# Patient Record
Sex: FEMALE | Race: WHITE | Employment: OTHER | URBAN - METROPOLITAN AREA
[De-identification: names, ages, dates, MRNs, and addresses within clinical notes are randomized per-mention and may not be internally consistent; named-entity substitution may affect disease eponyms.]

---

## 2023-04-25 DIAGNOSIS — R53.83 FATIGUE, UNSPECIFIED TYPE: ICD-10-CM

## 2023-04-25 DIAGNOSIS — D64.9 ANEMIA, UNSPECIFIED TYPE: ICD-10-CM

## 2023-04-25 DIAGNOSIS — E78.5 HYPERLIPIDEMIA, UNSPECIFIED HYPERLIPIDEMIA TYPE: ICD-10-CM

## 2023-04-25 DIAGNOSIS — R73.03 PREDIABETES: ICD-10-CM

## 2023-04-25 DIAGNOSIS — I10 HYPERTENSION, UNSPECIFIED TYPE: ICD-10-CM

## 2023-04-25 DIAGNOSIS — E55.9 VITAMIN D DEFICIENCY: ICD-10-CM

## 2023-06-20 LAB
NON-UH HIE A/G RATIO: 1.3
NON-UH HIE ALB: 4 G/DL (ref 3.4–5)
NON-UH HIE ALK PHOS: 95 UNIT/L (ref 46–116)
NON-UH HIE BASO COUNT: 0.03 X1000 (ref 0–0.2)
NON-UH HIE BASOS %: 0.4 %
NON-UH HIE BILIRUBIN, TOTAL: 0.7 MG/DL (ref 0.2–1)
NON-UH HIE BUN/CREAT RATIO: 23.8
NON-UH HIE BUN: 31 MG/DL (ref 9–23)
NON-UH HIE CALCIUM: 9.3 MG/DL (ref 8.7–10.4)
NON-UH HIE CALCULATED LDL CHOLESTEROL: 60 MG/DL (ref 60–130)
NON-UH HIE CALCULATED OSMOLALITY: 290 MOSM/KG (ref 275–295)
NON-UH HIE CHLORIDE: 106 MMOL/L (ref 98–107)
NON-UH HIE CHOLESTEROL: 133 MG/DL (ref 100–200)
NON-UH HIE CO2, VENOUS: 29 MMOL/L (ref 20–31)
NON-UH HIE CREATININE: 1.3 MG/DL (ref 0.5–0.8)
NON-UH HIE DIFF?: NO
NON-UH HIE EOS COUNT: 0.16 X1000 (ref 0–0.5)
NON-UH HIE EOSIN %: 2.1 %
NON-UH HIE FERRITIN: 48 NG/ML (ref 10–291)
NON-UH HIE GFR AA: 48
NON-UH HIE GLOBULIN: 3 G/DL
NON-UH HIE GLOMERULAR FILTRATION RATE: 39 ML/MIN/1.73M?
NON-UH HIE GLUCOSE: 100 MG/DL (ref 74–106)
NON-UH HIE GOT: 23 UNIT/L (ref 15–37)
NON-UH HIE GPT: 15 UNIT/L (ref 10–49)
NON-UH HIE HCT: 35.4 % (ref 36–46)
NON-UH HIE HDL CHOLESTEROL: 60 MG/DL (ref 40–60)
NON-UH HIE HGB A1C: 5.5 %
NON-UH HIE HGB: 11.9 G/DL (ref 12–16)
NON-UH HIE INSTR WBC: 7.6
NON-UH HIE IRON: 75 UG/DL (ref 40–170)
NON-UH HIE K: 4.7 MMOL/L (ref 3.5–5.1)
NON-UH HIE LYMPH %: 16 %
NON-UH HIE LYMPH COUNT: 1.22 X1000 (ref 1.2–4.8)
NON-UH HIE MCH: 32.1 PG (ref 27–34)
NON-UH HIE MCHC: 33.7 G/DL (ref 32–37)
NON-UH HIE MCV: 95.2 FL (ref 80–100)
NON-UH HIE MONO %: 9 %
NON-UH HIE MONO COUNT: 0.69 X1000 (ref 0.1–1)
NON-UH HIE MPV: 7.1 FL (ref 7.4–10.4)
NON-UH HIE NA: 142 MMOL/L (ref 135–145)
NON-UH HIE NEUTROPHIL %: 72.6 %
NON-UH HIE NEUTROPHIL COUNT (ANC): 5.53 X1000 (ref 1.4–8.8)
NON-UH HIE NUCLEATED RBC: 0 /100WBC
NON-UH HIE PLATELET: 253 X10 (ref 150–450)
NON-UH HIE RBC: 3.71 X10 (ref 4.2–5.4)
NON-UH HIE RDW: 13.7 % (ref 11.5–14.5)
NON-UH HIE TIBC: 341 UG/ML (ref 250–425)
NON-UH HIE TOTAL CHOL/HDL CHOL RATIO: 2.2
NON-UH HIE TOTAL PROTEIN: 7 G/DL (ref 5.7–8.2)
NON-UH HIE TRIGLYCERIDES: 66 MG/DL (ref 30–150)
NON-UH HIE TSH: 3.47 UIU/ML (ref 0.55–4.78)
NON-UH HIE VIT D 25: 54 NG/ML
NON-UH HIE VITAMIN B12: 433 PG/ML (ref 211–911)
NON-UH HIE WBC: 7.6 X10 (ref 4.5–11)

## 2023-07-13 ENCOUNTER — OFFICE VISIT (OUTPATIENT)
Dept: PRIMARY CARE | Facility: CLINIC | Age: 78
End: 2023-07-13
Payer: MEDICARE

## 2023-07-13 VITALS
WEIGHT: 151.2 LBS | HEIGHT: 63 IN | DIASTOLIC BLOOD PRESSURE: 66 MMHG | BODY MASS INDEX: 26.79 KG/M2 | TEMPERATURE: 98.1 F | HEART RATE: 74 BPM | SYSTOLIC BLOOD PRESSURE: 110 MMHG

## 2023-07-13 DIAGNOSIS — I50.9 CONGESTIVE HEART FAILURE, UNSPECIFIED HF CHRONICITY, UNSPECIFIED HEART FAILURE TYPE (MULTI): ICD-10-CM

## 2023-07-13 DIAGNOSIS — J44.9 CHRONIC OBSTRUCTIVE PULMONARY DISEASE, UNSPECIFIED COPD TYPE (MULTI): ICD-10-CM

## 2023-07-13 DIAGNOSIS — N18.32 STAGE 3B CHRONIC KIDNEY DISEASE (MULTI): ICD-10-CM

## 2023-07-13 DIAGNOSIS — E11.22 TYPE 2 DIABETES MELLITUS WITH CHRONIC KIDNEY DISEASE, WITHOUT LONG-TERM CURRENT USE OF INSULIN, UNSPECIFIED CKD STAGE (MULTI): ICD-10-CM

## 2023-07-13 DIAGNOSIS — J81.0 ACUTE PULMONARY EDEMA (MULTI): ICD-10-CM

## 2023-07-13 DIAGNOSIS — Z00.00 MEDICARE ANNUAL WELLNESS VISIT, SUBSEQUENT: Primary | ICD-10-CM

## 2023-07-13 PROBLEM — E55.9 VITAMIN D DEFICIENCY: Status: ACTIVE | Noted: 2023-07-13

## 2023-07-13 PROBLEM — I25.10 CAD (CORONARY ARTERY DISEASE): Status: ACTIVE | Noted: 2023-07-13

## 2023-07-13 PROBLEM — I10 HTN (HYPERTENSION): Status: ACTIVE | Noted: 2023-07-13

## 2023-07-13 PROBLEM — R91.1 LUNG NODULE: Status: ACTIVE | Noted: 2023-07-13

## 2023-07-13 PROBLEM — R53.83 FATIGUE: Status: ACTIVE | Noted: 2023-07-13

## 2023-07-13 PROBLEM — R73.09 ELEVATED GLUCOSE: Status: ACTIVE | Noted: 2023-07-13

## 2023-07-13 PROBLEM — Z86.16 HISTORY OF COVID-19: Status: ACTIVE | Noted: 2023-07-13

## 2023-07-13 PROBLEM — E78.5 HLD (HYPERLIPIDEMIA): Status: ACTIVE | Noted: 2023-07-13

## 2023-07-13 PROBLEM — D64.9 ANEMIA: Status: ACTIVE | Noted: 2023-07-13

## 2023-07-13 PROBLEM — R79.89 ELEVATED TSH: Status: ACTIVE | Noted: 2023-07-13

## 2023-07-13 PROCEDURE — 3074F SYST BP LT 130 MM HG: CPT | Performed by: FAMILY MEDICINE

## 2023-07-13 PROCEDURE — 3078F DIAST BP <80 MM HG: CPT | Performed by: FAMILY MEDICINE

## 2023-07-13 PROCEDURE — 1170F FXNL STATUS ASSESSED: CPT | Performed by: FAMILY MEDICINE

## 2023-07-13 PROCEDURE — G0439 PPPS, SUBSEQ VISIT: HCPCS | Performed by: FAMILY MEDICINE

## 2023-07-13 PROCEDURE — 1159F MED LIST DOCD IN RCRD: CPT | Performed by: FAMILY MEDICINE

## 2023-07-13 PROCEDURE — 1036F TOBACCO NON-USER: CPT | Performed by: FAMILY MEDICINE

## 2023-07-13 PROCEDURE — 1160F RVW MEDS BY RX/DR IN RCRD: CPT | Performed by: FAMILY MEDICINE

## 2023-07-13 RX ORDER — DEXTROMETHORPHAN HYDROBROMIDE, GUAIFENESIN 5; 100 MG/5ML; MG/5ML
1 LIQUID ORAL EVERY 8 HOURS PRN
COMMUNITY
Start: 2019-06-15

## 2023-07-13 RX ORDER — NAPROXEN SODIUM 220 MG/1
81 TABLET, FILM COATED ORAL EVERY 24 HOURS
COMMUNITY
Start: 2021-03-28

## 2023-07-13 RX ORDER — CLOPIDOGREL BISULFATE 75 MG/1
75 TABLET ORAL DAILY
COMMUNITY
Start: 2021-03-28 | End: 2023-12-28 | Stop reason: ALTCHOICE

## 2023-07-13 RX ORDER — METOPROLOL SUCCINATE 25 MG/1
25 TABLET, EXTENDED RELEASE ORAL DAILY
COMMUNITY

## 2023-07-13 RX ORDER — LISINOPRIL 5 MG/1
5 TABLET ORAL DAILY
COMMUNITY

## 2023-07-13 RX ORDER — CHOLECALCIFEROL (VITAMIN D3) 50 MCG
2000 TABLET ORAL DAILY
COMMUNITY
Start: 2019-05-14

## 2023-07-13 RX ORDER — ALBUTEROL SULFATE 90 UG/1
2 AEROSOL, METERED RESPIRATORY (INHALATION) EVERY 4 HOURS PRN
COMMUNITY
Start: 2018-05-16

## 2023-07-13 RX ORDER — ATORVASTATIN CALCIUM 40 MG/1
40 TABLET, FILM COATED ORAL DAILY
COMMUNITY

## 2023-07-13 RX ORDER — SPIRONOLACTONE 25 MG/1
25 TABLET ORAL EVERY 24 HOURS
COMMUNITY
Start: 2021-03-28

## 2023-07-13 RX ORDER — FUROSEMIDE 20 MG/1
20 TABLET ORAL
COMMUNITY
Start: 2021-03-28

## 2023-07-13 ASSESSMENT — PATIENT HEALTH QUESTIONNAIRE - PHQ9
1. LITTLE INTEREST OR PLEASURE IN DOING THINGS: NOT AT ALL
2. FEELING DOWN, DEPRESSED OR HOPELESS: NOT AT ALL
SUM OF ALL RESPONSES TO PHQ9 QUESTIONS 1 AND 2: 0

## 2023-07-13 ASSESSMENT — ACTIVITIES OF DAILY LIVING (ADL)
DOING_HOUSEWORK: INDEPENDENT
GROCERY_SHOPPING: INDEPENDENT
BATHING: INDEPENDENT
MANAGING_FINANCES: INDEPENDENT
DRESSING: INDEPENDENT
TAKING_MEDICATION: INDEPENDENT

## 2023-07-13 NOTE — PROGRESS NOTES
"Subjective   Reason for Visit: Mariam Zuniga is an 78 y.o. female here for a Medicare Wellness visit.     Past Medical, Surgical, and Family History reviewed and updated in chart.    Reviewed all medications by prescribing practitioner or clinical pharmacist (such as prescriptions, OTCs, herbal therapies and supplements) and documented in the medical record.    HPI  79 yo female presents for medicare wellness visit and f/u     Co Right calf bruising for a few days  No swelling; no pain  NKI but does bruise easily with plavix  No hx of dvt/Pe  No recent travel    Recent labs:  Cr 1.3  Lipids, tsh, vit D wnl  Hb 11.9; iron, b12 wnl  HbA1c down to 5.5     hx prediabetes - hba1c in 7/2022 was 6    hx CAD; hx of NSTEMI in march 2021 stents placed in june 2021.   quit smoking at that time.  sees pulm dr vaca- off inh; breathing is fine.   no recent chest pains.   +exercise.  gym - goes 3days/wk  Cardio- dr abel  Scheduled for nuclear stress test next wk    hx of CKD/low GFR-   seeing nephro dr noland    last mammo yrs ago- defers.   no breast changes.    2018 DEXA+osteopenia; defers rechecking  takes a Calcium + Vitamin D supplement  hx of vit D defic    defers colonoscopy or cologuard  did have some constipation issues- saw GI- dr centeno- who recommended colonoscopy but pt defers.   hx of anemia in past    shingles vaccine- not yet- defers  Pneumovax- had  prevnar 13 -had   Flu shot- had  covid shots- had      She denies any chest pains, palpitations, edema, shortness of breath, abdominal pains, reflux, nausea, vomiting, diarrhea, constipation, blood in stool, melena.     sees optho   sees dentist   no mole changes    hx of covid infx and lost taste /smell- saw ENT nadyae  never fully came back    /66   Pulse 74   Temp 36.7 °C (98.1 °F)   Ht 1.588 m (5' 2.5\")   Wt 68.6 kg (151 lb 3.2 oz)   BMI 27.21 kg/m²       Patient Self Assessment of Health Status  Patient Self Assessment: Good    Nutrition and " "Exercise  Current Diet: Well Balanced Diet  Adequate Fluid Intake: Yes  Caffeine: Yes  Exercise Frequency: Infrequently    Functional Ability/Level of Safety  Cognitive Impairment Observed: No cognitive impairment observed    Home Safety Risk Factors: None    Patient Care Team:  Mae Caballero DO as PCP - General  Mae Caballero DO as PCP - [facility] Attributed Provider  Zia Simons MD as Consulting Physician (Cardiology)     Review of Systems  ROS as stated in HPI    Medicare Wellness Billing Compliance Satisfied    *This is a visual tool to show completion of required items on the day of the visit. Green checks will only appear on the date of visit.      Objective   Vitals:  /66   Pulse 74   Temp 36.7 °C (98.1 °F)   Ht 1.588 m (5' 2.5\")   Wt 68.6 kg (151 lb 3.2 oz)   BMI 27.21 kg/m²       Physical Exam  Constitutional: A&O; NAD  HEENT: conjunctiva normal. EOMI; PERRLA; lids normal; TM's clear;   Neck: supple; No lymphadenopathy   Heart: RRR     Lungs: CTA bilateral   Abd: Soft, Nontender, Nondistended  Ext:  No edema; +ecchymosis right calf; nontender    Neuro: No gross neuro deficits    Psych: Judgment, orientation, mood, affect, insight wnl   Assessment/Plan   Problem List Items Addressed This Visit          Cardiac and Vasculature    CHF (congestive heart failure) (CMS/[facility])    Relevant Medications    clopidogrel (Plavix) 75 mg tablet    metoprolol succinate XL (Toprol-XL) 25 mg 24 hr tablet       Endocrine/Metabolic    Type 2 diabetes mellitus with chronic kidney disease, without long-term current use of insulin, unspecified CKD stage (CMS/[facility])       Genitourinary and Reproductive    Stage 3b chronic kidney disease       Pulmonary and Pneumonias    COPD (chronic obstructive pulmonary disease) (CMS/[facility])    Acute pulmonary edema (CMS/[facility])     Other Visit Diagnoses       Medicare annual wellness visit, subsequent    -  Primary          Reviewed recent labs- fax to cardio  f/u with " specialists.  cont to monitor BP  continue healthy diet and exercise.   defers shingrix; other immunizations UTD  defers cologuard, colonoscopy or mammo or dexa at this time.      Monitor BP

## 2023-07-14 PROBLEM — E11.22 TYPE 2 DIABETES MELLITUS WITH CHRONIC KIDNEY DISEASE, WITHOUT LONG-TERM CURRENT USE OF INSULIN, UNSPECIFIED CKD STAGE (MULTI): Status: ACTIVE | Noted: 2023-07-14

## 2023-07-14 PROBLEM — J81.0 ACUTE PULMONARY EDEMA (MULTI): Status: ACTIVE | Noted: 2023-07-14

## 2023-07-14 ASSESSMENT — ACTIVITIES OF DAILY LIVING (ADL)
BATHING: INDEPENDENT
TAKING_MEDICATION: INDEPENDENT
GROCERY_SHOPPING: INDEPENDENT
MANAGING_FINANCES: INDEPENDENT
DRESSING: INDEPENDENT
DOING_HOUSEWORK: INDEPENDENT

## 2023-07-14 ASSESSMENT — PATIENT HEALTH QUESTIONNAIRE - PHQ9
2. FEELING DOWN, DEPRESSED OR HOPELESS: NOT AT ALL
SUM OF ALL RESPONSES TO PHQ9 QUESTIONS 1 AND 2: 0
1. LITTLE INTEREST OR PLEASURE IN DOING THINGS: NOT AT ALL

## 2023-12-27 NOTE — PROGRESS NOTES
"Subjective   Patient ID: Mariam Zuniga is a 78 y.o. female who presents for Follow-up (No UTI symptoms ).    HPI   77 yo female presents for fu recent ER visit on 12/7/23- for covid and UTI    Had cough, congestion, fever, chills, weakness and urinary freq on presentation  Covid was +;  UA was +; no urine culture was sent  Was rxd antiviral and keflex  Is feeling better    Hx of bleeding in eye- seeing retina specialist  Her plavix was stopped  Still on baby aspirin    Pt reports she failed stress test earlier this yr and had a cath that showed blockage but couldn't stent  Sees cardio eryn    6/2023 labs:  Cr 1.3  Lipids, tsh, vit D wnl  Hb 11.9; iron, b12 wnl  HbA1c down to 5.5   hx prediabetes    hx CAD; hx of NSTEMI in march 2021 stents placed in june 2021.   quit smoking at that time.  sees pulm dr vaca- off inh; breathing is fine.  hasn't fu with pulm.   no recent chest pains.      hx of CKD/low GFR-   saw nephro dr noland- has been stable so nephro said she could just fu if any change per pt     last mammo yrs ago- defers.      2018 DEXA+osteopenia; defers rechecking  hx of vit D defic     defers colonoscopy or cologuard  did have some constipation issues- saw GI- dr centeno- who recommended colonoscopy but pt defers.   hx of mild anemia in past recent CBC in ER was wnl     shingles vaccine- not yet- defers  Pneumovax- had  prevnar 13 -had   Flu shot- had  covid shots- had; plans to get latest covid booster.  RSV- not yet        She denies any chest pains, palpitations, edema, shortness of breath, abdominal pains, reflux, nausea, vomiting, diarrhea, constipation, blood in stool, melena.        Review of Systems  ROS as stated in HPI    Objective   /77   Pulse 81   Temp 35.3 °C (95.5 °F)   Ht 1.588 m (5' 2.5\")   Wt 70.4 kg (155 lb 3.2 oz)   SpO2 95%   BMI 27.93 kg/m²     Physical Exam  Constitutional: A&O; NAD  HEENT: conjunctiva normal. EOMI; PERRLA; lids normal; TM's clear;   Neck: supple; " No lymphadenopathy   Heart: RRR     Lungs: CTA bilateral   Abd: Soft, Nontender, Nondistended  Ext:  No edema;    Neuro: No gross neuro deficits     Psych: Judgment, orientation, mood, affect, insight wnl   Assessment/Plan   Problem List Items Addressed This Visit             ICD-10-CM    Elevated glucose R73.09    History of COVID-19 Z86.16    HLD (hyperlipidemia) E78.5    HTN (hypertension) - Primary I10    Vitamin D deficiency E55.9    Acute on chronic diastolic (congestive) heart failure (CMS/HCC) I50.33     Other Visit Diagnoses         Codes    Urinary tract infection without hematuria, site unspecified     N39.0    Relevant Orders    POCT UA (nonautomated) manually resulted (Completed)    Urine Culture          Reviewed records from recent ER visit   f/u with specialists.  cont to monitor BP  continue healthy diet and exercise.   defers shingrix; other immunizations UTD  defers cologuard, colonoscopy or mammo or dexa at this time.      Monitor BP    recheck UA/culture  Obtain copy of cardio notes/stress test/cath  Fu for MWV in July  Getting eye injections-off plavix

## 2023-12-28 ENCOUNTER — OFFICE VISIT (OUTPATIENT)
Dept: PRIMARY CARE | Facility: CLINIC | Age: 78
End: 2023-12-28
Payer: MEDICARE

## 2023-12-28 VITALS
SYSTOLIC BLOOD PRESSURE: 125 MMHG | DIASTOLIC BLOOD PRESSURE: 77 MMHG | HEIGHT: 63 IN | WEIGHT: 155.2 LBS | TEMPERATURE: 95.5 F | OXYGEN SATURATION: 95 % | HEART RATE: 81 BPM | BODY MASS INDEX: 27.5 KG/M2

## 2023-12-28 DIAGNOSIS — I10 HYPERTENSION, UNSPECIFIED TYPE: Primary | ICD-10-CM

## 2023-12-28 DIAGNOSIS — E78.5 HYPERLIPIDEMIA, UNSPECIFIED HYPERLIPIDEMIA TYPE: ICD-10-CM

## 2023-12-28 DIAGNOSIS — R73.09 ELEVATED GLUCOSE: ICD-10-CM

## 2023-12-28 DIAGNOSIS — N39.0 URINARY TRACT INFECTION WITHOUT HEMATURIA, SITE UNSPECIFIED: ICD-10-CM

## 2023-12-28 DIAGNOSIS — Z86.16 HISTORY OF COVID-19: ICD-10-CM

## 2023-12-28 DIAGNOSIS — E55.9 VITAMIN D DEFICIENCY: ICD-10-CM

## 2023-12-28 DIAGNOSIS — I50.33 ACUTE ON CHRONIC DIASTOLIC (CONGESTIVE) HEART FAILURE (MULTI): ICD-10-CM

## 2023-12-28 LAB
POC APPEARANCE, URINE: CLEAR
POC BILIRUBIN, URINE: NEGATIVE
POC BLOOD, URINE: ABNORMAL
POC COLOR, URINE: YELLOW
POC GLUCOSE, URINE: NEGATIVE MG/DL
POC KETONES, URINE: NEGATIVE MG/DL
POC LEUKOCYTES, URINE: NEGATIVE
POC NITRITE,URINE: NEGATIVE
POC PH, URINE: 5.5 PH
POC PROTEIN, URINE: NEGATIVE MG/DL
POC SPECIFIC GRAVITY, URINE: 1.01
POC UROBILINOGEN, URINE: 0.2 EU/DL

## 2023-12-28 PROCEDURE — 87086 URINE CULTURE/COLONY COUNT: CPT

## 2023-12-28 PROCEDURE — 99214 OFFICE O/P EST MOD 30 MIN: CPT | Performed by: FAMILY MEDICINE

## 2023-12-28 PROCEDURE — 1159F MED LIST DOCD IN RCRD: CPT | Performed by: FAMILY MEDICINE

## 2023-12-28 PROCEDURE — 3074F SYST BP LT 130 MM HG: CPT | Performed by: FAMILY MEDICINE

## 2023-12-28 PROCEDURE — 1160F RVW MEDS BY RX/DR IN RCRD: CPT | Performed by: FAMILY MEDICINE

## 2023-12-28 PROCEDURE — 3078F DIAST BP <80 MM HG: CPT | Performed by: FAMILY MEDICINE

## 2023-12-28 PROCEDURE — 1036F TOBACCO NON-USER: CPT | Performed by: FAMILY MEDICINE

## 2023-12-28 PROCEDURE — 81002 URINALYSIS NONAUTO W/O SCOPE: CPT | Performed by: FAMILY MEDICINE

## 2023-12-28 ASSESSMENT — PATIENT HEALTH QUESTIONNAIRE - PHQ9
2. FEELING DOWN, DEPRESSED OR HOPELESS: NOT AT ALL
1. LITTLE INTEREST OR PLEASURE IN DOING THINGS: NOT AT ALL
SUM OF ALL RESPONSES TO PHQ9 QUESTIONS 1 AND 2: 0

## 2023-12-30 LAB — BACTERIA UR CULT: NO GROWTH

## 2024-01-02 ENCOUNTER — TELEPHONE (OUTPATIENT)
Dept: PRIMARY CARE | Facility: CLINIC | Age: 79
End: 2024-01-02
Payer: MEDICARE

## 2024-01-02 NOTE — TELEPHONE ENCOUNTER
Patient would like the blood orders for her physical in July mailed to her houses so she can get the blood work before her appointment.

## 2024-01-03 DIAGNOSIS — R53.83 OTHER FATIGUE: ICD-10-CM

## 2024-01-03 DIAGNOSIS — I10 HYPERTENSION, UNSPECIFIED TYPE: ICD-10-CM

## 2024-01-03 DIAGNOSIS — R73.09 ELEVATED GLUCOSE: ICD-10-CM

## 2024-01-03 DIAGNOSIS — E55.9 VITAMIN D DEFICIENCY: ICD-10-CM

## 2024-01-03 DIAGNOSIS — D64.9 ANEMIA, UNSPECIFIED TYPE: Primary | ICD-10-CM

## 2024-01-03 DIAGNOSIS — E78.5 HYPERLIPIDEMIA, UNSPECIFIED HYPERLIPIDEMIA TYPE: ICD-10-CM

## 2024-01-04 ENCOUNTER — TELEPHONE (OUTPATIENT)
Dept: PRIMARY CARE | Facility: CLINIC | Age: 79
End: 2024-01-04
Payer: MEDICARE

## 2024-01-04 NOTE — TELEPHONE ENCOUNTER
----- Message from Mae Caballero DO sent at 12/30/2023  2:09 PM EST -----  Please let pt know their urine culture was negative for an infection.

## 2024-05-14 LAB
HEMOGLOBIN A1C/HEMOGLOBIN TOTAL IN BLOOD EXTERNAL: 5.6 %
NON-UH HIE A/G RATIO: 1.2
NON-UH HIE ALB: 4 G/DL (ref 3.4–5)
NON-UH HIE ALK PHOS: 88 UNIT/L (ref 45–117)
NON-UH HIE BASO COUNT: 0.07 X1000 (ref 0–0.2)
NON-UH HIE BASOS %: 0.7 %
NON-UH HIE BILIRUBIN, TOTAL: 0.8 MG/DL (ref 0.3–1.2)
NON-UH HIE BUN/CREAT RATIO: 20
NON-UH HIE BUN: 30 MG/DL (ref 9–23)
NON-UH HIE CALCIUM: 9.5 MG/DL (ref 8.7–10.4)
NON-UH HIE CALCULATED LDL CHOLESTEROL: 65 MG/DL (ref 60–130)
NON-UH HIE CALCULATED OSMOLALITY: 286 MOSM/KG (ref 275–295)
NON-UH HIE CHLORIDE: 105 MMOL/L (ref 98–107)
NON-UH HIE CHOLESTEROL: 149 MG/DL (ref 100–200)
NON-UH HIE CO2, VENOUS: 28 MMOL/L (ref 20–31)
NON-UH HIE CREATININE: 1.5 MG/DL (ref 0.5–0.8)
NON-UH HIE DIFF?: NO
NON-UH HIE EOS COUNT: 0.14 X1000 (ref 0–0.5)
NON-UH HIE EOSIN %: 1.6 %
NON-UH HIE FERRITIN: 52 NG/ML (ref 10–291)
NON-UH HIE GFR AA: 40
NON-UH HIE GLOBULIN: 3.4 G/DL
NON-UH HIE GLOMERULAR FILTRATION RATE: 33 ML/MIN/1.73M?
NON-UH HIE GLUCOSE: 105 MG/DL (ref 74–106)
NON-UH HIE GOT: 29 UNIT/L (ref 15–37)
NON-UH HIE GPT: 18 UNIT/L (ref 10–49)
NON-UH HIE HCT: 37.9 % (ref 36–46)
NON-UH HIE HDL CHOLESTEROL: 68 MG/DL (ref 40–60)
NON-UH HIE HGB A1C: 5.6 %
NON-UH HIE HGB: 12.6 G/DL (ref 12–16)
NON-UH HIE INSTR WBC: 9.1
NON-UH HIE IRON: 121 UG/DL (ref 50–170)
NON-UH HIE K: 4.7 MMOL/L (ref 3.5–5.1)
NON-UH HIE LYMPH %: 15.7 %
NON-UH HIE LYMPH COUNT: 1.43 X1000 (ref 1.2–4.8)
NON-UH HIE MCH: 31.9 PG (ref 27–34)
NON-UH HIE MCHC: 33.2 G/DL (ref 32–37)
NON-UH HIE MCV: 96 FL (ref 80–100)
NON-UH HIE MONO %: 8.1 %
NON-UH HIE MONO COUNT: 0.74 X1000 (ref 0.1–1)
NON-UH HIE MPV: 7.3 FL (ref 7.4–10.4)
NON-UH HIE NA: 140 MMOL/L (ref 135–145)
NON-UH HIE NEUTROPHIL %: 73.9 %
NON-UH HIE NEUTROPHIL COUNT (ANC): 6.73 X1000 (ref 1.4–8.8)
NON-UH HIE NUCLEATED RBC: 0 /100WBC
NON-UH HIE PLATELET: 280 X10 (ref 150–450)
NON-UH HIE RBC: 3.95 X10 (ref 4.2–5.4)
NON-UH HIE RDW: 13.3 % (ref 11.5–14.5)
NON-UH HIE SATURATION: 32.3 % (ref 20–50)
NON-UH HIE TIBC: 375 UG/ML (ref 250–425)
NON-UH HIE TOTAL CHOL/HDL CHOL RATIO: 2.2
NON-UH HIE TOTAL PROTEIN: 7.4 G/DL (ref 5.7–8.2)
NON-UH HIE TRIGLYCERIDES: 81 MG/DL (ref 30–150)
NON-UH HIE TSH: 4.44 UIU/ML (ref 0.55–4.78)
NON-UH HIE VIT D 25: 69 NG/ML
NON-UH HIE VITAMIN B12: 626 PG/ML (ref 211–911)
NON-UH HIE WBC: 9.1 X10 (ref 4.5–11)

## 2024-05-16 ENCOUNTER — TELEPHONE (OUTPATIENT)
Dept: PRIMARY CARE | Facility: CLINIC | Age: 79
End: 2024-05-16
Payer: MEDICARE

## 2024-05-16 NOTE — TELEPHONE ENCOUNTER
----- Message from Mae Caballero, DO sent at 5/14/2024  6:54 PM EDT -----  Please let pt know that her  blood counts, iron, vitamin B12, vitamin D, sugar, electrolytes, thyroid, liver function are all normal and her cholesterol was good.   Her kidney function was again elevated at 1.5 (should be less than 0.8).    Please fax to dr noland and let her know we will continue to monitor.

## 2024-07-07 NOTE — PROGRESS NOTES
"Subjective   Reason for Visit: Mariam Zuniga is an 79 y.o. female here for a Medicare Wellness visit.     Past Medical, Surgical, and Family History reviewed and updated in chart.    Reviewed all medications by prescribing practitioner or clinical pharmacist (such as prescriptions, OTCs, herbal therapies and supplements) and documented in the medical record.    HPI    80 yo female presents for MWV    Hx of bleeding in eye- seeing retina specialist  Her plavix was stopped  Still on baby aspirin  Getting injections in both eyes    5/14/24  labs: cbc, iron, b12, vit D, tsh, lipids were good.   Cr 1.5 - sees dr noland  HbA1c down to 5.6      hx prediabetes    hx CAD; hx of NSTEMI in march 2021 stents placed in june 2021.   quit smoking at that time.  sees pulm dr vaca- off inh; breathing is fine.  hasn't fu with pulm.   no recent chest pains.    sees cardio- depam/raorence -saw him 7/5/24  Goes to gym 3 days/wk    hx of CKD/low GFR-   saw nephro dr noland- has been stable so nephro said she could just fu if any change;  last saw him 10/2023     last mammo yrs ago- defers.      2018 DEXA+osteopenia; defers rechecking  hx of vit D defic  Takes vit d supplement     defers colonoscopy or cologuard  did have some constipation issues- saw GI- dr centeno- who recommended colonoscopy but pt defers and bowels have been better  hx of mild anemia in past recent CBC was wnl     shingles vaccine- not yet- defers  Pneumovax- had  prevnar 13 -had   Flu shot- had  covid shots- had and had booster 1/2024  RSV- had        She denies any chest pains, palpitations, edema, shortness of breath, abdominal pains, reflux, nausea, vomiting, diarrhea, constipation, blood in stool, melena.     Sees optho  Sees dentist for regular cleanings  No mole changes  Doesn't see derm  No hx of skin CA       /55   Pulse 67   Temp 36.7 °C (98.1 °F)   Ht 1.588 m (5' 2.5\")   Wt 68.6 kg (151 lb 3.2 oz)   SpO2 93%   BMI 27.21 kg/m²     Patient " "Self Assessment of Health Status  Patient Self Assessment: Good    Nutrition and Exercise  Current Diet: Well Balanced Diet  Adequate Fluid Intake: Yes  Caffeine: Yes  Exercise Frequency: Infrequently    Functional Ability/Level of Safety  Cognitive Impairment Observed: No cognitive impairment observed    Home Safety Risk Factors: None    Patient Care Team:  Mae Caballero DO as PCP - General  Mae Caballero DO as PCP - [facility] ACO Attributed Provider  Zia Simons MD as Consulting Physician (Cardiology)     Review of Systems  ROS as stated in HPI    Medicare Wellness Billing Compliance Satisfied    *This is a visual tool to show completion of required items on the day of the visit. Green checks will only appear on the date of visit.      Objective   Vitals:  /55   Pulse 67   Temp 36.7 °C (98.1 °F)   Ht 1.588 m (5' 2.5\")   Wt 68.6 kg (151 lb 3.2 oz)   SpO2 93%   BMI 27.21 kg/m²       Physical Exam  Constitutional: A&O; NAD  HEENT: conjunctiva normal. EOMI; PERRLA; lids normal; TM's clear;   Neck: supple; No lymphadenopathy   Heart: RRR     Lungs: CTA bilateral   Abd: Soft, Nontender, Nondistended  Ext:  No edema;    Neuro: No gross neuro deficits     Psych: Judgment, orientation, mood, affect, insight wnl   Assessment/Plan   Problem List Items Addressed This Visit       Stage 3b chronic kidney disease (Multi)    COPD (chronic obstructive pulmonary disease) (Multi)    HTN (hypertension)    Relevant Orders    Basic metabolic panel    Acute pulmonary edema (Multi)    Acute on chronic diastolic (congestive) heart failure (Multi)    Other specified peripheral vascular diseases (CMS-HCC)     Other Visit Diagnoses       Medicare annual wellness visit, subsequent    -  Primary    Elevated serum creatinine        Relevant Orders    Basic metabolic panel        reviewed labs from 5/14/24;   Recheck BMP today  cont to monitor BP  continue healthy diet and exercise.   defers shingrix; other immunizations " UTD  defers cologuard, colonoscopy or mammo or dexa at this time.      Monitor BP   Reviewed recent cardio note from 7/5/24; fu 1 yr

## 2024-07-09 ENCOUNTER — APPOINTMENT (OUTPATIENT)
Dept: PRIMARY CARE | Facility: CLINIC | Age: 79
End: 2024-07-09
Payer: MEDICARE

## 2024-07-09 ENCOUNTER — LAB (OUTPATIENT)
Dept: LAB | Facility: LAB | Age: 79
End: 2024-07-09
Payer: MEDICARE

## 2024-07-09 VITALS
BODY MASS INDEX: 26.79 KG/M2 | WEIGHT: 151.2 LBS | HEIGHT: 63 IN | SYSTOLIC BLOOD PRESSURE: 123 MMHG | OXYGEN SATURATION: 93 % | TEMPERATURE: 98.1 F | HEART RATE: 67 BPM | DIASTOLIC BLOOD PRESSURE: 55 MMHG

## 2024-07-09 DIAGNOSIS — R79.89 ELEVATED SERUM CREATININE: ICD-10-CM

## 2024-07-09 DIAGNOSIS — J81.0 ACUTE PULMONARY EDEMA (MULTI): ICD-10-CM

## 2024-07-09 DIAGNOSIS — J44.9 CHRONIC OBSTRUCTIVE PULMONARY DISEASE, UNSPECIFIED COPD TYPE (MULTI): ICD-10-CM

## 2024-07-09 DIAGNOSIS — I10 HYPERTENSION, UNSPECIFIED TYPE: ICD-10-CM

## 2024-07-09 DIAGNOSIS — N18.32 STAGE 3B CHRONIC KIDNEY DISEASE (MULTI): ICD-10-CM

## 2024-07-09 DIAGNOSIS — I50.33 ACUTE ON CHRONIC DIASTOLIC (CONGESTIVE) HEART FAILURE (MULTI): ICD-10-CM

## 2024-07-09 DIAGNOSIS — I73.89 OTHER SPECIFIED PERIPHERAL VASCULAR DISEASES (CMS-HCC): ICD-10-CM

## 2024-07-09 DIAGNOSIS — Z00.00 MEDICARE ANNUAL WELLNESS VISIT, SUBSEQUENT: Primary | ICD-10-CM

## 2024-07-09 PROBLEM — E11.22 TYPE 2 DIABETES MELLITUS WITH CHRONIC KIDNEY DISEASE, WITHOUT LONG-TERM CURRENT USE OF INSULIN, UNSPECIFIED CKD STAGE (MULTI): Status: RESOLVED | Noted: 2023-07-14 | Resolved: 2024-07-09

## 2024-07-09 LAB
ANION GAP SERPL CALC-SCNC: 17 MMOL/L (ref 10–20)
BUN SERPL-MCNC: 36 MG/DL (ref 6–23)
CALCIUM SERPL-MCNC: 9.1 MG/DL (ref 8.6–10.6)
CHLORIDE SERPL-SCNC: 100 MMOL/L (ref 98–107)
CO2 SERPL-SCNC: 25 MMOL/L (ref 21–32)
CREAT SERPL-MCNC: 1.55 MG/DL (ref 0.5–1.05)
EGFRCR SERPLBLD CKD-EPI 2021: 34 ML/MIN/1.73M*2
GLUCOSE SERPL-MCNC: 84 MG/DL (ref 74–99)
POTASSIUM SERPL-SCNC: 4.7 MMOL/L (ref 3.5–5.3)
SODIUM SERPL-SCNC: 137 MMOL/L (ref 136–145)

## 2024-07-09 PROCEDURE — 80048 BASIC METABOLIC PNL TOTAL CA: CPT

## 2024-07-09 PROCEDURE — 1170F FXNL STATUS ASSESSED: CPT | Performed by: FAMILY MEDICINE

## 2024-07-09 PROCEDURE — 36415 COLL VENOUS BLD VENIPUNCTURE: CPT

## 2024-07-09 PROCEDURE — G0439 PPPS, SUBSEQ VISIT: HCPCS | Performed by: FAMILY MEDICINE

## 2024-07-09 PROCEDURE — 1159F MED LIST DOCD IN RCRD: CPT | Performed by: FAMILY MEDICINE

## 2024-07-09 PROCEDURE — 3074F SYST BP LT 130 MM HG: CPT | Performed by: FAMILY MEDICINE

## 2024-07-09 PROCEDURE — 1036F TOBACCO NON-USER: CPT | Performed by: FAMILY MEDICINE

## 2024-07-09 PROCEDURE — 1123F ACP DISCUSS/DSCN MKR DOCD: CPT | Performed by: FAMILY MEDICINE

## 2024-07-09 PROCEDURE — 3078F DIAST BP <80 MM HG: CPT | Performed by: FAMILY MEDICINE

## 2024-07-09 PROCEDURE — 1160F RVW MEDS BY RX/DR IN RCRD: CPT | Performed by: FAMILY MEDICINE

## 2024-07-09 PROCEDURE — 1158F ADVNC CARE PLAN TLK DOCD: CPT | Performed by: FAMILY MEDICINE

## 2024-07-09 ASSESSMENT — ACTIVITIES OF DAILY LIVING (ADL)
DRESSING: INDEPENDENT
MANAGING_FINANCES: INDEPENDENT
TAKING_MEDICATION: INDEPENDENT
GROCERY_SHOPPING: INDEPENDENT
DOING_HOUSEWORK: INDEPENDENT
BATHING: INDEPENDENT

## 2024-07-10 ENCOUNTER — TELEPHONE (OUTPATIENT)
Dept: PRIMARY CARE | Facility: CLINIC | Age: 79
End: 2024-07-10
Payer: MEDICARE

## 2024-07-10 NOTE — TELEPHONE ENCOUNTER
----- Message from Mae Caballero sent at 7/9/2024  7:05 PM EDT -----  Let patient know her repeat kidney function was again elevated at 1.55.  (Should be less than 1.05)  We can continue to monitor.   Please fax results to her nephrologist Dr. Ernst  
Informed patient.   
no

## 2024-07-16 ENCOUNTER — APPOINTMENT (OUTPATIENT)
Dept: PRIMARY CARE | Facility: CLINIC | Age: 79
End: 2024-07-16
Payer: MEDICARE

## 2024-07-18 ENCOUNTER — TELEPHONE (OUTPATIENT)
Dept: PRIMARY CARE | Facility: CLINIC | Age: 79
End: 2024-07-18
Payer: MEDICARE

## 2024-07-18 NOTE — TELEPHONE ENCOUNTER
Pt went to Dentist and they were asking if she should take a premedication before her dental visits such as cleanings, etc. Please call asap to [phone]

## 2024-07-19 NOTE — TELEPHONE ENCOUNTER
Spoke with patient.  She has been trying to get a hold of the cardiologist with no success.  She believes she doesn't need one, but will try to call them again.

## 2024-09-23 ENCOUNTER — TELEPHONE (OUTPATIENT)
Dept: PRIMARY CARE | Facility: CLINIC | Age: 79
End: 2024-09-23
Payer: MEDICARE
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
## 2025-06-30 ENCOUNTER — TELEPHONE (OUTPATIENT)
Dept: PRIMARY CARE | Facility: CLINIC | Age: 80
End: 2025-06-30
Payer: MEDICARE

## 2025-06-30 DIAGNOSIS — R73.09 ELEVATED GLUCOSE: ICD-10-CM

## 2025-06-30 DIAGNOSIS — R53.83 OTHER FATIGUE: ICD-10-CM

## 2025-06-30 DIAGNOSIS — E55.9 VITAMIN D DEFICIENCY: ICD-10-CM

## 2025-06-30 DIAGNOSIS — Z00.00 HEALTHCARE MAINTENANCE: Primary | ICD-10-CM

## 2025-06-30 DIAGNOSIS — R79.89 ELEVATED SERUM CREATININE: ICD-10-CM

## 2025-06-30 DIAGNOSIS — I10 HYPERTENSION, UNSPECIFIED TYPE: ICD-10-CM

## 2025-06-30 DIAGNOSIS — E78.5 HYPERLIPIDEMIA, UNSPECIFIED HYPERLIPIDEMIA TYPE: ICD-10-CM

## 2025-06-30 NOTE — TELEPHONE ENCOUNTER
Please enter labs so she can have them done before her 7/15 appt.  She also wants the orders mailed to her.

## 2025-07-07 LAB
NON-UH HIE A/G RATIO: 1.1
NON-UH HIE ALB: 4.1 G/DL (ref 3.4–5)
NON-UH HIE ALK PHOS: 111 UNIT/L (ref 45–117)
NON-UH HIE BILIRUBIN, TOTAL: 0.6 MG/DL (ref 0.3–1.2)
NON-UH HIE BUN/CREAT RATIO: 21.7
NON-UH HIE BUN: 39 MG/DL (ref 9–23)
NON-UH HIE CALCIUM: 9.5 MG/DL (ref 8.7–10.4)
NON-UH HIE CALCULATED LDL CHOLESTEROL: 62 MG/DL (ref 60–130)
NON-UH HIE CALCULATED OSMOLALITY: 281 MOSM/KG (ref 275–295)
NON-UH HIE CHLORIDE: 101 MMOL/L (ref 98–107)
NON-UH HIE CHOLESTEROL: 135 MG/DL (ref 100–200)
NON-UH HIE CO2, VENOUS: 27 MMOL/L (ref 20–31)
NON-UH HIE CREATININE: 1.8 MG/DL (ref 0.5–0.8)
NON-UH HIE GFR AA: 33
NON-UH HIE GLOBULIN: 3.8 G/DL
NON-UH HIE GLOMERULAR FILTRATION RATE: 27 ML/MIN/1.73M?
NON-UH HIE GLUCOSE: 97 MG/DL (ref 74–106)
NON-UH HIE GOT: 25 UNIT/L (ref 15–37)
NON-UH HIE GPT: 14 UNIT/L (ref 10–49)
NON-UH HIE HCT: 35.6 % (ref 36–46)
NON-UH HIE HDL CHOLESTEROL: 61 MG/DL (ref 40–60)
NON-UH HIE HGB A1C: 5.9 %
NON-UH HIE HGB: 12 G/DL (ref 12–16)
NON-UH HIE INSTR WBC ND: 8.9
NON-UH HIE K: 5.3 MMOL/L (ref 3.5–5.1)
NON-UH HIE MCH: 32.4 PG (ref 27–34)
NON-UH HIE MCHC: 33.7 G/DL (ref 32–37)
NON-UH HIE MCV: 96 FL (ref 80–100)
NON-UH HIE MPV: 7.2 FL (ref 7.4–10.4)
NON-UH HIE NA: 136 MMOL/L (ref 135–145)
NON-UH HIE PLATELET: 298 X10 (ref 150–450)
NON-UH HIE RBC: 3.71 X10 (ref 4.2–5.4)
NON-UH HIE RDW: 13.2 % (ref 11.5–14.5)
NON-UH HIE TOTAL CHOL/HDL CHOL RATIO: 2.2
NON-UH HIE TOTAL PROTEIN: 7.9 G/DL (ref 5.7–8.2)
NON-UH HIE TRIGLYCERIDES: 62 MG/DL (ref 30–150)
NON-UH HIE TSH: 3.88 UIU/ML (ref 0.55–4.78)
NON-UH HIE VIT D 25: 85 NG/ML
NON-UH HIE WBC: 8.9 X10 (ref 4.5–11)

## 2025-07-14 NOTE — PROGRESS NOTES
"Subjective   Reason for Visit: Mariam Zuniga is an 80 y.o. female here for a Medicare Wellness visit.     Past Medical, Surgical, and Family History reviewed and updated in chart.    Reviewed all medications by prescribing practitioner or clinical pharmacist (such as prescriptions, OTCs, herbal therapies and supplements) and documented in the medical record.    HPI  81 yo female presents for MWV    recent labs   vit D, tsh, lipids, cbc were ok  cr 1.8(was 1.5 last yr) nephro nuzhat  K 5.3   hba1c 5.9 (was 5.6 last yr)      Hx of bleeding in eye- seeing retina specialist  Her plavix was stopped  Still on baby aspirin  Getting injections in both eyes    hx prediabetes    hx CAD; hx of NSTEMI in march 2021 stents placed in june 2021.   quit smoking at that time.  sees pulm dr vaca- off inh; breathing is fine.  hasn't fu with pulm.   no recent chest pains.    sees cardio- deucher/pohorence- appt on thurs  Goes to gym 3 days/wk    hx of CKD/low GFR-   saw nephro dr noland- has been stable so nephro said she could just fu if any change;  last saw him 10/2023     last mammo yrs ago- defers.      2018 DEXA+osteopenia; defers rechecking  hx of vit D defic  Takes vit d supplement     defers colonoscopy or cologuard  did have some constipation issues- saw GI- dr centeno- who recommended colonoscopy but pt defers and bowels have been better  hx of mild anemia in past recent CBC was wnl     shingles vaccine- not yet- defers  Pneumovax- had  prevnar 13 -had   Flu shot- had  covid shots- had and had booster 1/2024  RSV- had        She denies any chest pains, palpitations, edema, shortness of breath, abdominal pains, reflux, nausea, vomiting, diarrhea, constipation, blood in stool, melena.     Sees optho  Sees dentist for regular cleanings  No mole changes  Doesn't see derm  No hx of skin CA        /68   Pulse 67   Temp 36.8 °C (98.3 °F)   Ht 1.588 m (5' 2.5\")   Wt 67.8 kg (149 lb 6.4 oz)   BMI 26.89 kg/m²   Patient " "Self Assessment of Health Status  Patient Self Assessment: Good    Nutrition and Exercise  Current Diet: Well Balanced Diet  Adequate Fluid Intake: Yes  Caffeine: Yes  Exercise Frequency: Infrequently    Functional Ability/Level of Safety  Cognitive Impairment Observed: No cognitive impairment observed    Home Safety Risk Factors: None    Patient Care Team:  Mae Caballero DO as PCP - General  Mae Caballero DO as PCP - [facility] ACO Attributed Provider  Zia Simons MD as Consulting Physician (Cardiology)  Pasha Ernst MD as Consulting Physician (Nephrology)     Review of Systems  ROS as stated in HPI    Medicare Wellness Billing Compliance Satisfied    *This is a visual tool to show completion of required items on the day of the visit. Green checks will only appear on the date of visit.      Objective   Vitals:  /68   Pulse 67   Temp 36.8 °C (98.3 °F)   Ht 1.588 m (5' 2.5\")   Wt 67.8 kg (149 lb 6.4 oz)   BMI 26.89 kg/m²       Physical Exam  Constitutional: A&O; NAD  HEENT: conjunctiva normal. EOMI; PERRLA; lids normal; TM's clear;   Neck: supple; No lymphadenopathy   Heart: RRR     Lungs: CTA bilateral   Abd: Soft, Nontender, Nondistended  Ext:  No edema;    Neuro: No gross neuro deficits     Psych: Judgment, orientation, mood, affect, insight wnl   Assessment/Plan   Problem List Items Addressed This Visit    None  Visit Diagnoses         Medicare annual wellness visit, subsequent    -  Primary      Hyperkalemia        Relevant Orders    Basic metabolic panel      Elevated serum creatinine        Relevant Orders    Basic metabolic panel        reviewed recent labs  recheck BMP today re K and cr;  recommend fu with nephro if cr still elevated  cont to monitor BP  continue healthy diet and exercise.   defers shingrix; other immunizations UTD  defers cologuard, colonoscopy or mammo or dexa at this time.  upcoming fu with cardio       "

## 2025-07-15 ENCOUNTER — APPOINTMENT (OUTPATIENT)
Dept: PRIMARY CARE | Facility: CLINIC | Age: 80
End: 2025-07-15
Payer: MEDICARE

## 2025-07-15 VITALS
HEART RATE: 67 BPM | SYSTOLIC BLOOD PRESSURE: 117 MMHG | DIASTOLIC BLOOD PRESSURE: 68 MMHG | TEMPERATURE: 98.3 F | BODY MASS INDEX: 26.47 KG/M2 | WEIGHT: 149.4 LBS | HEIGHT: 63 IN

## 2025-07-15 DIAGNOSIS — I50.9 CONGESTIVE HEART FAILURE, UNSPECIFIED HF CHRONICITY, UNSPECIFIED HEART FAILURE TYPE: ICD-10-CM

## 2025-07-15 DIAGNOSIS — J44.9 CHRONIC OBSTRUCTIVE PULMONARY DISEASE, UNSPECIFIED COPD TYPE (MULTI): ICD-10-CM

## 2025-07-15 DIAGNOSIS — E87.5 HYPERKALEMIA: ICD-10-CM

## 2025-07-15 DIAGNOSIS — R79.89 ELEVATED SERUM CREATININE: ICD-10-CM

## 2025-07-15 DIAGNOSIS — Z00.00 MEDICARE ANNUAL WELLNESS VISIT, SUBSEQUENT: Primary | ICD-10-CM

## 2025-07-15 DIAGNOSIS — N18.32 STAGE 3B CHRONIC KIDNEY DISEASE (MULTI): ICD-10-CM

## 2025-07-15 PROBLEM — J81.0 ACUTE PULMONARY EDEMA: Status: RESOLVED | Noted: 2023-07-14 | Resolved: 2025-07-15

## 2025-07-15 PROBLEM — I50.33 ACUTE ON CHRONIC DIASTOLIC (CONGESTIVE) HEART FAILURE: Status: RESOLVED | Noted: 2023-12-28 | Resolved: 2025-07-15

## 2025-07-15 PROCEDURE — 1170F FXNL STATUS ASSESSED: CPT | Performed by: FAMILY MEDICINE

## 2025-07-15 PROCEDURE — 3074F SYST BP LT 130 MM HG: CPT | Performed by: FAMILY MEDICINE

## 2025-07-15 PROCEDURE — 1159F MED LIST DOCD IN RCRD: CPT | Performed by: FAMILY MEDICINE

## 2025-07-15 PROCEDURE — 3078F DIAST BP <80 MM HG: CPT | Performed by: FAMILY MEDICINE

## 2025-07-15 PROCEDURE — 1036F TOBACCO NON-USER: CPT | Performed by: FAMILY MEDICINE

## 2025-07-15 PROCEDURE — 1123F ACP DISCUSS/DSCN MKR DOCD: CPT | Performed by: FAMILY MEDICINE

## 2025-07-15 PROCEDURE — G0439 PPPS, SUBSEQ VISIT: HCPCS | Performed by: FAMILY MEDICINE

## 2025-07-15 PROCEDURE — 1160F RVW MEDS BY RX/DR IN RCRD: CPT | Performed by: FAMILY MEDICINE

## 2025-07-15 ASSESSMENT — ACTIVITIES OF DAILY LIVING (ADL)
GROCERY_SHOPPING: INDEPENDENT
BATHING: INDEPENDENT
MANAGING_FINANCES: INDEPENDENT
DOING_HOUSEWORK: INDEPENDENT
TAKING_MEDICATION: INDEPENDENT
DRESSING: INDEPENDENT

## 2025-07-16 ENCOUNTER — RESULTS FOLLOW-UP (OUTPATIENT)
Dept: PRIMARY CARE | Facility: CLINIC | Age: 80
End: 2025-07-16
Payer: MEDICARE

## 2025-07-16 LAB
ANION GAP SERPL CALCULATED.4IONS-SCNC: 11 MMOL/L (CALC) (ref 7–17)
BUN SERPL-MCNC: 39 MG/DL (ref 7–25)
BUN/CREAT SERPL: 27 (CALC) (ref 6–22)
CALCIUM SERPL-MCNC: 9.5 MG/DL (ref 8.6–10.4)
CHLORIDE SERPL-SCNC: 98 MMOL/L (ref 98–110)
CO2 SERPL-SCNC: 26 MMOL/L (ref 20–32)
CREAT SERPL-MCNC: 1.47 MG/DL (ref 0.6–0.95)
EGFRCR SERPLBLD CKD-EPI 2021: 36 ML/MIN/1.73M2
GLUCOSE SERPL-MCNC: 88 MG/DL (ref 65–139)
POTASSIUM SERPL-SCNC: 5 MMOL/L (ref 3.5–5.3)
SODIUM SERPL-SCNC: 135 MMOL/L (ref 135–146)

## 2026-07-21 ENCOUNTER — APPOINTMENT (OUTPATIENT)
Dept: PRIMARY CARE | Facility: CLINIC | Age: 81
End: 2026-07-21
Payer: MEDICARE